# Patient Record
Sex: FEMALE | Race: BLACK OR AFRICAN AMERICAN
[De-identification: names, ages, dates, MRNs, and addresses within clinical notes are randomized per-mention and may not be internally consistent; named-entity substitution may affect disease eponyms.]

---

## 2017-12-28 ENCOUNTER — HOSPITAL ENCOUNTER (EMERGENCY)
Dept: HOSPITAL 61 - ER | Age: 62
Discharge: HOME | End: 2017-12-28
Payer: COMMERCIAL
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
## 2017-12-28 DIAGNOSIS — E11.9: ICD-10-CM

## 2017-12-28 DIAGNOSIS — Z90.711: ICD-10-CM
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
## 2017-12-28 DIAGNOSIS — M25.511: Primary | ICD-10-CM
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
## 2017-12-28 PROCEDURE — 99283 EMERGENCY DEPT VISIT LOW MDM: CPT

## 2018-04-24 ENCOUNTER — HOSPITAL ENCOUNTER (EMERGENCY)
Dept: HOSPITAL 61 - ER | Age: 63
Discharge: HOME | End: 2018-04-24
Payer: COMMERCIAL

## 2018-04-24 DIAGNOSIS — S13.9XXA: Primary | ICD-10-CM

## 2018-04-24 DIAGNOSIS — I10: ICD-10-CM
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
## 2018-04-24 DIAGNOSIS — M47.896: ICD-10-CM

## 2018-04-24 DIAGNOSIS — E78.00: ICD-10-CM

## 2018-04-24 DIAGNOSIS — E11.9: ICD-10-CM

## 2018-04-24 DIAGNOSIS — M25.511: ICD-10-CM
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
## 2018-04-24 DIAGNOSIS — S23.3XXA: ICD-10-CM

## 2018-04-24 DIAGNOSIS — M47.892: ICD-10-CM

## 2018-04-24 DIAGNOSIS — J45.909: ICD-10-CM

## 2018-04-24 DIAGNOSIS — Z90.711: ICD-10-CM

## 2018-04-24 PROCEDURE — 72100 X-RAY EXAM L-S SPINE 2/3 VWS: CPT

## 2018-04-24 PROCEDURE — 72072 X-RAY EXAM THORAC SPINE 3VWS: CPT

## 2018-04-24 PROCEDURE — 73030 X-RAY EXAM OF SHOULDER: CPT

## 2018-04-24 PROCEDURE — 72125 CT NECK SPINE W/O DYE: CPT

## 2018-04-24 PROCEDURE — 99284 EMERGENCY DEPT VISIT MOD MDM: CPT

## 2022-04-28 ENCOUNTER — HOSPITAL ENCOUNTER (OUTPATIENT)
Dept: HOSPITAL 61 - SURG | Age: 67
Discharge: HOME | End: 2022-04-28
Attending: ORTHOPAEDIC SURGERY
Payer: MEDICARE

## 2022-04-28 VITALS — DIASTOLIC BLOOD PRESSURE: 86 MMHG | SYSTOLIC BLOOD PRESSURE: 154 MMHG

## 2022-04-28 VITALS — BODY MASS INDEX: 34.63 KG/M2 | WEIGHT: 202.83 LBS | HEIGHT: 64 IN

## 2022-04-28 VITALS — DIASTOLIC BLOOD PRESSURE: 80 MMHG | SYSTOLIC BLOOD PRESSURE: 137 MMHG

## 2022-04-28 DIAGNOSIS — Z90.710: ICD-10-CM

## 2022-04-28 DIAGNOSIS — I10: ICD-10-CM

## 2022-04-28 DIAGNOSIS — Z98.890: ICD-10-CM

## 2022-04-28 DIAGNOSIS — E78.00: ICD-10-CM

## 2022-04-28 DIAGNOSIS — K21.9: ICD-10-CM

## 2022-04-28 DIAGNOSIS — Z88.5: ICD-10-CM

## 2022-04-28 DIAGNOSIS — Z79.899: ICD-10-CM

## 2022-04-28 DIAGNOSIS — G56.01: Primary | ICD-10-CM

## 2022-04-28 DIAGNOSIS — M65.311: ICD-10-CM

## 2022-04-28 PROCEDURE — A4930 STERILE, GLOVES PER PAIR: HCPCS

## 2022-04-28 PROCEDURE — 64721 CARPAL TUNNEL SURGERY: CPT

## 2022-04-28 PROCEDURE — A6402 STERILE GAUZE <= 16 SQ IN: HCPCS

## 2022-04-28 PROCEDURE — A4452 WATERPROOF TAPE: HCPCS

## 2022-04-28 PROCEDURE — A6452 HIGH COMPRES BAND W>=3"<5"YD: HCPCS

## 2022-04-28 PROCEDURE — 01N50ZZ RELEASE MEDIAN NERVE, OPEN APPROACH: ICD-10-PCS | Performed by: ORTHOPAEDIC SURGERY

## 2022-04-28 PROCEDURE — A6223 GAUZE >16<=48 NO W/SAL W/O B: HCPCS

## 2022-04-28 PROCEDURE — 26055 INCISE FINGER TENDON SHEATH: CPT

## 2022-04-28 NOTE — PDOC4
Operative Note


Operative Note


Date of procedure: 4/28/2022





Surgeon: Fred Sorensen





Assistant: Jose Lancaster





Preoperative diagnosis: #1 right carpal tunnel syndrome


2.  Right trigger thumb





Postoperative diagnosis: Same





Procedures performed: #1 open right carpal tunnel release


2.  Open right trigger thumb release





Anesthesia: General





Findings: Thickened nodule in flexor tendon at the and normal-appearing median 

nerve





Blood loss: 2 mL





Complications: None





Tourniquet time: Less than 20 minutes





Reason for procedure: Patient is a very pleasant 67-year-old female who is 

clinical examination was consistent with carpal tunnel syndrome and was having 

increasing difficulty performing her ADLs.  Her trigger thumb was also interfe

ring with her ability to use her hand.  Her and I had a discussion of the risk 

benefits alternatives to surgery and she elected to proceed.





Description of procedure: Patient was greeted in the preoperative area by myself

or the correct extremity was verified and marked.  She was taken to the 

operative suite and antibiotics were started as she was brought back.  Once in 

the operating room, she was transferred gently supine to the operating room 

table and secured to bed with all pressure points padded and underwent 

successful induction of a general anesthetic.  The hand board was attached to 

the operating room table.  Nonsterile tourniquet was applied to right upper 

extremity.  Right upper extremity was then prepped and draped in the usual 

sterile fashion we conducted our standard preoperative timeout.  Extremity was 

exsanguinated with an Esmarch and tourniquet insufflated to 250 mmHg.  After 

this.  I incised skin from the distal wrist crease into the palm through a 

palmar crease and dissected subcutaneous tissue with a hemostat and used bipolar

cautery for hemostasis.  I identified the palmar fascia and incised this in line

with the skin incision.  Identified the transverse carpal ligament and releases 

sharply.  I placed a Ragnell retractor at the distal extent of the incision, 

spread above and below small remaining portion of the transverse carpal ligament

and released it with tenotomy's.  I repeated this maneuver in an ulnar directed 

fashion to release the distal antebrachial fascia as well.  After this, I pa

lpate along the course of the nerve to help and tried accomplish a complete 

release and I was confident I had.  I then directed my attention to make an 

incision at the base of her thumb just distal to the thickened nodule and 

incised skin with a scalpel and dissected subcutaneous tissue with a small 

curved hemostat until identified the A1 pulley which I then released with 

tenotomy's.  I protected the surrounding soft tissues with Ragnell retractors 

for this trigger thumb release portion.  Bipolar cautery was used here for a 

couple of bleeders at the skin edge.  After this, the wounds were thoroughly 

irrigated out, the skin was closed with simple interrupted 3-0 nylon at both.  

Local anesthetic was injected into the periincisional soft tissues.  At the 

inclusion, the tourniquet was let down and a soft sterile bulky dressing was 

applied followed by an Ace wrap.  All counts were correct x2.  No complications.

 Surgery was well-tolerated by the patient and at the conclusion she was 

awakened and transferred gently supine to the recovery cart and taken to PACU in

stable and extubated condition.  Postoperative plan is to discharge patient ho

me, she can use her hand for light ADLs.  Wound care was discussed as well.  I 

will see her back in 2 weeks, sooner should a problem arise.











FRED SORENSEN II, MD        Apr 28, 2022 11:55

## 2022-04-28 NOTE — DISCH
DISCHARGE INSTRUCTIONS


Condition on Discharge


Condition on Discharge:  Stable





Activity After Discharge


Activity Instructions for Disc:  Other ROM activity


Other activity instructions:  Keep hand elevated, wiggle fingers


Bathing Instructions:  Shower-keep dressing dry


Lifting Instructions after Dis:  No heavy lifting, No pulling or pushing, Do not

lift >10 pounds


Weight Bearing Status after Di:  As tolerated





Diet after Discharge


Diet after Discharge:  Regular





Wound Incision Care


Wound/Incision Care:  Ice to area for comfort, Keep wound/cast CDI, Keep wound 

elevated, Change dressing


Other wound/incision instructi:  Okay to change dressing in 2 days





Contacting the [facility] after DC


Call your doctor for:  Concerns you may have





Follow-Up


Follow up with:  Surgeon in 2 weeks





Treatment/Equipment after DC


Adaptive Equipment Issued:  None











WHIT SORENSEN II, MD        Apr 28, 2022 11:26